# Patient Record
Sex: MALE | NOT HISPANIC OR LATINO | URBAN - METROPOLITAN AREA
[De-identification: names, ages, dates, MRNs, and addresses within clinical notes are randomized per-mention and may not be internally consistent; named-entity substitution may affect disease eponyms.]

---

## 2017-09-24 ENCOUNTER — EMERGENCY (EMERGENCY)
Facility: HOSPITAL | Age: 37
LOS: 1 days | Discharge: HOME | End: 2017-09-24
Admitting: EMERGENCY MEDICINE
Payer: COMMERCIAL

## 2017-09-24 VITALS
OXYGEN SATURATION: 96 % | SYSTOLIC BLOOD PRESSURE: 147 MMHG | TEMPERATURE: 98 F | DIASTOLIC BLOOD PRESSURE: 87 MMHG | HEART RATE: 88 BPM | RESPIRATION RATE: 16 BRPM

## 2017-09-24 PROCEDURE — 99284 EMERGENCY DEPT VISIT MOD MDM: CPT

## 2017-09-24 RX ADMIN — Medication 60 MILLIGRAM(S): at 19:41

## 2017-09-24 NOTE — ED PROVIDER NOTE - CRANIAL NERVE AND PUPILLARY EXAM
R facial droop at forehead, nasolabial fold, able to fully close R eye but cannot resist eyelid opening.  no sensory deficits.

## 2017-09-24 NOTE — ED PROVIDER NOTE - OBJECTIVE STATEMENT
36 y/o male no pmh c/o R facial "weakness" x1 day. Pt admits to having R eye blurry vision x1 ago which improved on its own. Pt then developed R sided headache which resolved on its own. Pt then developed drooling out of the R side of his mouth and noticed that his face was different on the R side an presented to ER. Pt denies chest pain, sob, palpitations, diaphoresis, n/v/d, numbness, tingling, dizziness, syncope, rash, URI symptoms, fever or chills.

## 2025-07-11 ENCOUNTER — APPOINTMENT (OUTPATIENT)
Dept: RHEUMATOLOGY | Facility: CLINIC | Age: 45
End: 2025-07-11